# Patient Record
Sex: FEMALE
[De-identification: names, ages, dates, MRNs, and addresses within clinical notes are randomized per-mention and may not be internally consistent; named-entity substitution may affect disease eponyms.]

---

## 2021-06-21 PROBLEM — Z00.00 ENCOUNTER FOR PREVENTIVE HEALTH EXAMINATION: Status: ACTIVE | Noted: 2021-06-21

## 2021-06-23 ENCOUNTER — APPOINTMENT (OUTPATIENT)
Dept: ORTHOPEDIC SURGERY | Facility: CLINIC | Age: 56
End: 2021-06-23
Payer: COMMERCIAL

## 2021-06-23 DIAGNOSIS — Z78.9 OTHER SPECIFIED HEALTH STATUS: ICD-10-CM

## 2021-06-23 DIAGNOSIS — M76.01 GLUTEAL TENDINITIS, RIGHT HIP: ICD-10-CM

## 2021-06-23 DIAGNOSIS — M51.36 OTHER INTERVERTEBRAL DISC DEGENERATION, LUMBAR REGION: ICD-10-CM

## 2021-06-23 DIAGNOSIS — M43.17 SPONDYLOLISTHESIS, LUMBOSACRAL REGION: ICD-10-CM

## 2021-06-23 PROCEDURE — 99203 OFFICE O/P NEW LOW 30 MIN: CPT | Mod: 25

## 2021-06-23 PROCEDURE — 20551 NJX 1 TENDON ORIGIN/INSJ: CPT | Mod: RT

## 2021-06-23 PROCEDURE — 72100 X-RAY EXAM L-S SPINE 2/3 VWS: CPT

## 2021-06-23 PROCEDURE — 72170 X-RAY EXAM OF PELVIS: CPT

## 2021-06-24 NOTE — HISTORY OF PRESENT ILLNESS
[de-identified] : Ms Mata is a 56 yo woman who comes in with pain in her right superior upper pelvic area.  Pain started about 3 to 4 months ago after an exercise class.  She had pain the next day.  She had liposuction and abdominoplasty about a year ago and has some numbness in that area and a lot of scar tissue but this pain was new.  She went for physical therapy.  She took a little Advil for a few days initially but nothing chronically.\par Pain initially was about 7-8 out of 10 but now is 4 out of 10.  It typically hurts just when she is sitting or gets up from sitting.  It is fine when she is standing and walking.  Sitting in a car is bad.  In therapy they did a lot of strengthening and stretching which helped partially but not completely.

## 2021-06-24 NOTE — PROCEDURE
[Injection] : Injection [Right] : of the right [Inflammation] : Inflammation [Patient] : patient [Risk] : risk [Benefits] : benefits [Alternatives] : alternatives [Bleeding] : bleeding [Infection] : infection [Allergic Reaction] : allergic reaction [Verbal Consent Obtained] : verbal consent was obtained prior to the procedure [Ethyl Chloride Spray] : ethyl chloride spray was used as a topical anesthetic [Direct] : direct [25] : a 25-gauge [1% Lidocaine___(mL)] : [unfilled] mL of 1% Lidocaine [Methylpred. 40mg/mL___(mL)] : [unfilled] mL 40mg/mL methylprednisolone [Bandage Applied] : a bandage [Tolerated Well] : The patient tolerated the procedure well [None] : none [No Strenuous Activity___day(s)] : avoid strenuous activity for [unfilled] day(s) [PRN] : as needed [de-identified] : posterior superior ilium/ at gluteal origin/tender [de-identified] : site of tenderness

## 2021-06-24 NOTE — PHYSICAL EXAM
[LE] : Sensory: Intact in bilateral lower extremities [Normal RLE] : Right Lower Extremity: No scars, rashes, lesions, ulcers, skin intact [Normal LLE] : Left Lower Extremity: No scars, rashes, lesions, ulcers, skin intact [Normal Touch] : sensation intact for touch [Normal] : Oriented to person, place, and time, insight and judgement were intact and the affect was normal [de-identified] : Mildly overweight [de-identified] : Low Back\par Walks with a normal gait.  Normal range of motion lumbar spine.\par She can walk on her heels and toes.\par There is some tenderness through the lumbar spine on the right side down through the right sacroiliac joint and then more significant focal tenderness just inferior to the posterior superior iliac crest which is the site of her primary pain.\par No edema or ecchymoses.\par There are some well-healed scars from her up abdominoplasty surgery.\par Negative straight leg raise.\par Motor and sensation are intact distally.\par Hip range of motion is within normal limits and without any pain on flexion and internal and external rotation.\par Negative Sacha. [de-identified] : \par AP pelvic x-ray today shows no normalities at the superior iliac crest.  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  At L1-L2 there is also some disc degeneration.  Mild, 10 degree curvature in the lumbar spine

## 2021-06-24 NOTE — ASSESSMENT
[FreeTextEntry1] : 56 yo pain in the right posterior superior pelvic region around the gluteal origin consistent with gluteal tendinitis.  I think some of her pain is due to the asymmetry in the spine with a mild curvature and has a spondylolisthesis at L5-S1.  She has some back pain in addition to the right posterior pelvic pain.\par She has been trying therapy and this is been going on for many months I suggested a steroid injection to see if this alleviates the pain in the gluteal origin which was performed today.  Heat and ice.  Stretching and core strengthening.  She was referred to physical therapy.  She should be careful with bending and lifting.  She currently is not having any neurologic symptoms or deficits.  With the spondylolisthesis she could be prone to radicular pain or symptoms.\par Follow-up if its not better in 6 to 8 weeks.

## 2021-08-04 ENCOUNTER — APPOINTMENT (OUTPATIENT)
Dept: ORTHOPEDIC SURGERY | Facility: CLINIC | Age: 56
End: 2021-08-04

## 2021-08-04 NOTE — PHYSICAL EXAM
[LE] : Sensory: Intact in bilateral lower extremities [Normal RLE] : Right Lower Extremity: No scars, rashes, lesions, ulcers, skin intact [Normal LLE] : Left Lower Extremity: No scars, rashes, lesions, ulcers, skin intact [Normal Touch] : sensation intact for touch [Normal] : Oriented to person, place, and time, insight and judgement were intact and the affect was normal [de-identified] : Low Back\par Walks with a normal gait.  Normal range of motion lumbar spine.\par She can walk on her heels and toes.\par There is some tenderness through the lumbar spine on the right side down through the right sacroiliac joint and then more significant focal tenderness just inferior to the posterior superior iliac crest which is the site of her primary pain.\par No edema or ecchymoses.\par There are some well-healed scars from her up abdominoplasty surgery.\par Negative straight leg raise.\par Motor and sensation are intact distally.\par Hip range of motion is within normal limits and without any pain on flexion and internal and external rotation.\par Negative Sacha. [de-identified] : Mildly overweight [de-identified] : \par AP pelvic x-ray today shows no normalities at the superior iliac crest.  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  At L1-L2 there is also some disc degeneration.  Mild, 10 degree curvature in the lumbar spine

## 2021-08-04 NOTE — ASSESSMENT
[FreeTextEntry1] : 55 yo pain in the right posterior superior pelvic region around the gluteal origin consistent with gluteal tendinitis.  I think some of her pain is due to the asymmetry in the spine with a mild curvature and has a spondylolisthesis at L5-S1.  She has some back pain in addition to the right posterior pelvic pain.\par She has been trying therapy and this is been going on for many months I suggested a steroid injection to see if this alleviates the pain in the gluteal origin which was performed today.  Heat and ice.  Stretching and core strengthening.  She was referred to physical therapy.  She should be careful with bending and lifting.  She currently is not having any neurologic symptoms or deficits.  With the spondylolisthesis she could be prone to radicular pain or symptoms.\par Follow-up if its not better in 6 to 8 weeks.

## 2021-08-04 NOTE — HISTORY OF PRESENT ILLNESS
[de-identified] : Ms Mata comes for f/u for  pain in her right superior upper pelvic area.  Pain started about 3 to 4 months ago after an exercise class.  \par Pain initially was about 7-8 out of 10 but now is 4 out of 10.  It typically hurts just when she is sitting or gets up from sitting.  It is fine when she is standing and walking.  Sitting in a car is bad.  In therapy they did a lot of strengthening and stretching which helped partially but not completely.